# Patient Record
Sex: FEMALE | Race: WHITE | NOT HISPANIC OR LATINO | URBAN - METROPOLITAN AREA
[De-identification: names, ages, dates, MRNs, and addresses within clinical notes are randomized per-mention and may not be internally consistent; named-entity substitution may affect disease eponyms.]

---

## 2018-01-05 ENCOUNTER — APPOINTMENT (RX ONLY)
Dept: URBAN - METROPOLITAN AREA CLINIC 130 | Facility: CLINIC | Age: 38
Setting detail: DERMATOLOGY
End: 2018-01-05

## 2018-01-05 DIAGNOSIS — D22 MELANOCYTIC NEVI: ICD-10-CM

## 2018-01-05 DIAGNOSIS — D18.0 HEMANGIOMA: ICD-10-CM

## 2018-01-05 DIAGNOSIS — L84 CORNS AND CALLOSITIES: ICD-10-CM

## 2018-01-05 PROBLEM — D18.01 HEMANGIOMA OF SKIN AND SUBCUTANEOUS TISSUE: Status: ACTIVE | Noted: 2018-01-05

## 2018-01-05 PROBLEM — D22.61 MELANOCYTIC NEVI OF RIGHT UPPER LIMB, INCLUDING SHOULDER: Status: ACTIVE | Noted: 2018-01-05

## 2018-01-05 PROBLEM — D22.62 MELANOCYTIC NEVI OF LEFT UPPER LIMB, INCLUDING SHOULDER: Status: ACTIVE | Noted: 2018-01-05

## 2018-01-05 PROBLEM — D22.5 MELANOCYTIC NEVI OF TRUNK: Status: ACTIVE | Noted: 2018-01-05

## 2018-01-05 PROBLEM — D48.5 NEOPLASM OF UNCERTAIN BEHAVIOR OF SKIN: Status: ACTIVE | Noted: 2018-01-05

## 2018-01-05 PROBLEM — J45.909 UNSPECIFIED ASTHMA, UNCOMPLICATED: Status: ACTIVE | Noted: 2018-01-05

## 2018-01-05 PROCEDURE — ? COUNSELING

## 2018-01-05 PROCEDURE — 99203 OFFICE O/P NEW LOW 30 MIN: CPT

## 2018-01-05 ASSESSMENT — LOCATION SIMPLE DESCRIPTION DERM
LOCATION SIMPLE: PLANTAR SURFACE OF RIGHT 3RD TOE
LOCATION SIMPLE: RIGHT POSTERIOR UPPER ARM
LOCATION SIMPLE: RIGHT UPPER BACK
LOCATION SIMPLE: RIGHT FOREHEAD
LOCATION SIMPLE: LEFT POSTERIOR UPPER ARM

## 2018-01-05 ASSESSMENT — LOCATION ZONE DERM
LOCATION ZONE: FACE
LOCATION ZONE: TRUNK
LOCATION ZONE: ARM
LOCATION ZONE: TOE

## 2018-01-05 ASSESSMENT — LOCATION DETAILED DESCRIPTION DERM
LOCATION DETAILED: LEFT DISTAL POSTERIOR UPPER ARM
LOCATION DETAILED: RIGHT DISTAL POSTERIOR UPPER ARM
LOCATION DETAILED: RIGHT LATERAL FOREHEAD
LOCATION DETAILED: RIGHT LATERAL PLANTAR 3RD TOE
LOCATION DETAILED: RIGHT INFERIOR UPPER BACK

## 2018-01-05 NOTE — HPI: EVALUATION OF SKIN LESION(S)
How Severe Are Your Spot(S)?: mild
Have Your Spot(S) Been Treated In The Past?: has not been treated
Hpi Title: Evaluation of Skin Lesions
Additional History: also check right foot mid to  noticed a spot there.
Year Removed: 1900

## 2023-04-05 ENCOUNTER — TELEPHONE (OUTPATIENT)
Dept: SURGERY | Facility: CLINIC | Age: 43
End: 2023-04-05

## 2023-04-05 ENCOUNTER — OFFICE VISIT (OUTPATIENT)
Dept: SURGERY | Facility: CLINIC | Age: 43
End: 2023-04-05
Payer: OTHER GOVERNMENT

## 2023-04-05 VITALS — HEIGHT: 67.72 IN | WEIGHT: 158.38 LBS | BODY MASS INDEX: 24.28 KG/M2

## 2023-04-05 DIAGNOSIS — E65 ABDOMINAL PANNUS: Primary | ICD-10-CM

## 2023-04-05 PROBLEM — Z98.891 HX OF CESAREAN SECTION: Status: ACTIVE | Noted: 2023-04-05

## 2023-04-05 PROBLEM — Z90.710 HISTORY OF HYSTERECTOMY: Status: ACTIVE | Noted: 2018-01-01

## 2023-04-05 PROCEDURE — 3008F BODY MASS INDEX DOCD: CPT | Performed by: SURGERY

## 2023-04-05 PROCEDURE — 99243 OFF/OP CNSLTJ NEW/EST LOW 30: CPT | Performed by: SURGERY

## 2023-04-05 RX ORDER — SPIRONOLACTONE 100 MG/1
TABLET, FILM COATED ORAL
COMMUNITY

## 2023-04-05 RX ORDER — TOPIRAMATE 25 MG/1
TABLET ORAL
COMMUNITY

## 2023-04-05 RX ORDER — NALTREXONE HYDROCHLORIDE AND BUPROPION HYDROCHLORIDE 8; 90 MG/1; MG/1
TABLET, EXTENDED RELEASE ORAL
COMMUNITY

## 2023-04-05 RX ORDER — METFORMIN HYDROCHLORIDE 500 MG/1
1 TABLET, EXTENDED RELEASE ORAL 3 TIMES DAILY
COMMUNITY

## 2023-04-05 RX ORDER — FUROSEMIDE 20 MG/1
20 TABLET ORAL 2 TIMES DAILY
COMMUNITY
Start: 2023-01-15

## 2023-04-05 RX ORDER — NYSTATIN 100000 U/G
30 CREAM TOPICAL AS NEEDED
COMMUNITY

## 2023-04-05 RX ORDER — ALPRAZOLAM 0.25 MG/1
TABLET ORAL
COMMUNITY

## 2023-04-05 RX ORDER — ALBUTEROL SULFATE 90 UG/1
AEROSOL, METERED RESPIRATORY (INHALATION)
COMMUNITY

## 2023-04-05 NOTE — TELEPHONE ENCOUNTER
Patient instructed to have her nutritional labs drawn. Areli verbalized understanding and was appreciative of the call. [id number] Detailed

## 2023-05-03 ENCOUNTER — TELEPHONE (OUTPATIENT)
Dept: SURGERY | Facility: CLINIC | Age: 43
End: 2023-05-03

## 2023-05-03 NOTE — TELEPHONE ENCOUNTER
patient called to state that her \"referral to insurance in incomplete from Dr. Efrain Tristan" She stated that her insurance is trying to authorize a panniculectomy. Call was transferred to Dung Hein, Cosmetic Coordinator.

## 2023-05-04 ENCOUNTER — TELEPHONE (OUTPATIENT)
Dept: SURGERY | Facility: CLINIC | Age: 43
End: 2023-05-04

## 2023-05-04 NOTE — TELEPHONE ENCOUNTER
Returning pt's call, lvm stating her case has been updated with the missing information and we will reach back out once we receive a determination. Left my direct call back number should she have any other questions or concerns.

## 2023-05-05 ENCOUNTER — TELEPHONE (OUTPATIENT)
Dept: SURGERY | Facility: CLINIC | Age: 43
End: 2023-05-05

## 2023-05-05 NOTE — TELEPHONE ENCOUNTER
Spoke with patient to remind her to get nutritional labs completed. Patient stated she will have these done and was appreciative of the call.

## 2023-05-23 ENCOUNTER — TELEPHONE (OUTPATIENT)
Dept: SURGERY | Facility: CLINIC | Age: 43
End: 2023-05-23

## 2024-01-10 ENCOUNTER — OFFICE VISIT (OUTPATIENT)
Dept: SURGERY | Facility: CLINIC | Age: 44
End: 2024-01-10
Payer: OTHER GOVERNMENT

## 2024-01-10 DIAGNOSIS — E65 ABDOMINAL PANNUS: Primary | ICD-10-CM

## 2024-01-10 PROCEDURE — 99213 OFFICE O/P EST LOW 20 MIN: CPT | Performed by: SURGERY

## 2024-01-10 NOTE — PATIENT INSTRUCTIONS
Surgeon:              Dr. Breann Smith, PhD                                          Tel:         [phone]                                  Fax:        [phone]      Surgery/Procedure: Panniculectomy, abdominoplasty . 3.5 hours, general anesthesia, outpatient.     Dx Code: E65    Hospital:  [facility]: [address] [place] [address]           [phone]  [facility]: [address], [place] [address]               [phone]    1. Someone will need to drive you to and from the hospital if your procedure is outpatient.    2.Do not drink alcohol or smoke 24 hours prior to your procedure.    3. Bring a picture ID and your insurance card.    4. You will be contacted by the hospital the day before to confirm the procedure time and location.     5. Do not take any herbal supplements or blood thinners at least one week before your procedure/surgery. This includes NSAID's (aspirin, baby aspirin, Motrin, Ibuprofen, Aleve, Advil, Naproxen, etc), Plavix, fish oil, vitamin E, turmeric, CoQ10, or green tea supplements, etc. *TYLENOL or acetaminophen is ok to take*    6. PRE-OPERATIVE TESTING: History and physical with medical clearance is REQUIRED within 30 days of the surgery date and is mandatory per Dr. Smith. *If this is not done, your surgery will be postponed*  MEDICAL CLEARANCE WITH Dr. Monroe  CBC  CMP  EKG (within 90 days)  Nutritional Labs  Discuss when to discontinue Contrave with prescribing provider perioperatively     7. Please inform us if you start or change any medications at least one week before surgery (ex: blood thinners, weight loss medications, diabetic medications, herbal supplements, etc)    8. Does patient have diagnosis of sleep apnea?    [   ] Yes     [ X ]  No    Consent obtained

## 2024-01-10 NOTE — CONSULTS
Estabilshed Patient Consultation    Chief Complaint: abdominal pannus     History of Present Illness:   Areli Villanueva is a 43 year old female who returns to the office for her abdominal pannus.  She has lost approximately 60 to 65 pounds naturally.  She reports she is at her goal weight and has been here for approximately 1 year.  She exercises 2-3 times per week.  She has skin rashes and irritations often on worse in the spring to fall months (warmer months).  She has tried nystatin cream for her rashes.  She does note lower back pain and has seen her primary care provider, a chiropractor, a massage therapist for this.  She bought a massage chair for her back pain.  She does take pain medication for her pain when needed.  She is here today to review surgical treatment options for her abdominal pannus.       Past Medical History:      Past Medical History:   Diagnosis Date    Anxiety     PCOS (polycystic ovarian syndrome)          Past Surgical History:  Past Surgical History:   Procedure Laterality Date    HC  SECTION LEVEL I      , , , 2017    HYSTERECTOMY  2018         Medications:    No outpatient medications have been marked as taking for the 1/10/24 encounter (Office Visit) with Breann Smith MD.         Allergies:    No Known Allergies      Family History:   Family History   Problem Relation Age of Onset    Melanoma Mother     Diabetes Father     PTSD Brother     Stroke Brother          Social History:  History   Alcohol Use    Yes       History   Smoking Status    Never   Smokeless Tobacco    Never       History   Drug Use Unknown         Review of Systems:    The patient reports: see HPI  All other systems are unremarkable.      Physical Exam:    There were no vitals taken for this visit.    Constitutional: The patient is an alert, oriented and well-developed.     Neurologic: Speech patterns and movements are normal.     Psychiatric: Affect is appropriate.    Eyes: Conjunctiva are  clear, non-icteric. PERRL    ENT: no obvious abnormality, no ear drainage, mucous membranes moist and pink    Integument/Skin: The skin appears normal. There are no suspicious appearing rashes or lesions.    Respiratory: Normal respiratory effort.     Cardiovascular: no cyanosis, no edema    Gastrointestinal: no hernias, skin irritation in her  scar, pannus past  scar onto mons pubis      Musculoskeletal: Extremities unremarkable, without edema, tenderness or deformities    Impression:   Areli Villanueva  is a 43 year old with a symptomatic abdominal pannus     Discussion and Plan:  The patient was counseled on the different treatment options.     Patient has a symptomatic abdominal pannus. She has lost approximately 60-65 pounds and has been stable at her goal weight for approximately 1 year.  She has skin irritations and rashes and has sought treatment with nystatin cream.  She notes lower back pain due to her pannus and his sought treatment from her primary care provider, a chiropractor, and a massage therapist.  She takes pain medication when needed due to her abdominal pannus.  We reviewed the option for panniculectomy, abdominoplasty.  This is indicated and medically necessary. She would like to proceed with panniculectomy, abdominoplasty. We discussed the expected post-operative course in detail including the need for drains. She reports she got her nutritional labs at an outside lab and will send these to us. She will obtain routine preoperative testing and clearance.     The different treatment options were discussed with the patient.  The procedures and the postoperative care was discussed in detail.  Potential risks complications benefits and alternatives were discussed.  Risks and complications including but not limited to infection, bleeding, scarring, damage to surrounding structures, such as nerves, blood vessels, muscles,  tendons, risk of hematoma, seroma, foreign body reaction,  allergic reaction, wound dehiscences, delayed wound healing, asymmetry, umbilical necrosis, skin necrosis, numbness, need for further surgery.    Patient understands and wishes to proceed with the procedure, questions were answered.    25 minutes were spent with the patient, from which 20 minutes were spent counseling the patient and coordinating care.

## 2024-01-11 ENCOUNTER — TELEPHONE (OUTPATIENT)
Dept: SURGERY | Facility: CLINIC | Age: 44
End: 2024-01-11

## 2024-01-11 NOTE — TELEPHONE ENCOUNTER
Patient is taking Contrave per her medical record. I called to inform the patient that she will need to discuss when to stop this perioperatively with her prescribing provider once she is given a surgery date.  Patient informed me that she gets this medication through her Endocrinologist.   Patient was appreciative of the call and verbalized understanding.

## 2024-01-15 ENCOUNTER — HOSPITAL ENCOUNTER (OUTPATIENT)
Age: 44
Discharge: HOME | End: 2024-01-15
Payer: OTHER GOVERNMENT

## 2024-01-15 VITALS
TEMPERATURE: 98 F | RESPIRATION RATE: 18 BRPM | HEIGHT: 67 IN | DIASTOLIC BLOOD PRESSURE: 79 MMHG | OXYGEN SATURATION: 96 % | SYSTOLIC BLOOD PRESSURE: 113 MMHG | BODY MASS INDEX: 24.33 KG/M2 | WEIGHT: 155 LBS | HEART RATE: 89 BPM

## 2024-01-15 DIAGNOSIS — J02.9 SORE THROAT: Primary | ICD-10-CM

## 2024-01-15 LAB
S PYO AG THROAT QL: NEGATIVE
SARS-COV-2 RNA RESP QL NAA+PROBE: NOT DETECTED

## 2024-01-15 PROCEDURE — 87880 STREP A ASSAY W/OPTIC: CPT | Performed by: NURSE PRACTITIONER

## 2024-01-15 PROCEDURE — 99203 OFFICE O/P NEW LOW 30 MIN: CPT | Performed by: NURSE PRACTITIONER

## 2024-01-15 PROCEDURE — U0002 COVID-19 LAB TEST NON-CDC: HCPCS | Performed by: NURSE PRACTITIONER

## 2024-01-15 RX ORDER — IBUPROFEN 600 MG/1
600 TABLET ORAL ONCE
Status: COMPLETED | OUTPATIENT
Start: 2024-01-15 | End: 2024-01-15

## 2024-01-15 RX ORDER — CHLORAL HYDRATE 500 MG
1000 CAPSULE ORAL DAILY
COMMUNITY

## 2024-01-15 NOTE — ED PROVIDER NOTES
Patient Seen in: [facility]      History     Chief Complaint   Patient presents with    Sore Throat     Stated Complaint: lost voice    Subjective:   HPI  43-year-old female presents to the IC with complaints of a sore throat and loss of voice for last 2 to 3 days.  States her  is sick home with a ear infection.  Denies any different swelling just pain with swallowing.  No fever.  No shortness of breath or any difficulty breathing.  Patient has no other issues complaints or concerns.  The patient's medication list, past medical history and social history elements as listed in today's nurse's notes were reviewed and agreed (except as otherwise stated in the HPI).  The patient's family history reviewed and determined to be noncontributory to the presenting problem.      Objective:   Past Medical History:   Diagnosis Date    Anxiety     PCOS (polycystic ovarian syndrome)               Past Surgical History:   Procedure Laterality Date    HC  SECTION LEVEL I      , , , 2017    HYSTERECTOMY  2018                No pertinent social history.            Review of Systems    Positive for stated complaint: lost voice  Other systems are as noted in HPI.  Constitutional and vital signs reviewed.      All other systems reviewed and negative except as noted above.    Physical Exam     ED Triage Vitals [01/15/24 0807]   /79   Pulse 89   Resp 18   Temp 98.1 °F (36.7 °C)   Temp src Temporal   SpO2 96 %   O2 Device None (Room air)       Current:/79   Pulse 89   Temp 98.1 °F (36.7 °C) (Temporal)   Resp 18   Ht 170.2 cm (5' 7\")   Wt 70.3 kg   SpO2 96%   BMI 24.28 kg/m²         Physical Exam    GENERAL: The patient is well-developed well-nourished nontoxic, non-ill-appearing  HEENT: Normocephalic.  Atraumatic.  Extraocular motions are intact  NECK: Supple.  No meningitic signs are noted.   CHEST/LUNGS: Clear to auscultation.  There is no respiratory distress  noted.  HEART/CARDIOVASCULAR: Regular.  There is no tachycardia.   SKIN: There is no rash.  NEURO: The patient is awake, alert, and oriented.  The patient is cooperative.    ED Course     Labs Reviewed   POCT RAPID STREP - Normal   RAPID SARS-COV-2 BY PCR - Normal                   MDM   Pertinent Labs & Imaging studies reviewed. (See chart for details)  Differential diagnosis considered but not limited to: Strep, viral pharyngitis, COVID, peritonsillar abscess, viral syndrome  Patient coming in with sore throat for 2 to 3 days. Patient provided with pain medication. Labs reviewed negative strep negative COVID.  . Will treat for viral pharyngitis. Will discharge on over-the-counter supportive care see discharge note for instructions. Patient is comfortable with this plan.  Overall Pt looks good. Non-toxic, well-hydrated and in no respiratory distress. Vital signs are reassuring. Exam is reassuring. I do not believe pt  requires and additional  diagnostic studiesor intervention. I believe pt  can be discharged home to continue evaluation as an outpatient. Follow-up provider given. Discharge instructions given and reviewed. Return for any problems. All understand and agreewith the plan.    Please note that this report has been produced using speech recognition software and may contain errors related to that system including, but not limited to, errors in grammar, punctuation, and spelling, as well as words and phrases that possibly may have been recognized inappropriately.  If there are any questions or concerns, contact the dictating provider for clarification.    Note to patient: The 21st Century Cures Act makes medical notes like these available to patients in the interest of transparency. However, this is a medical document intended as peer to peer communication. It is written in medical language and may contain abbreviations or verbiage that are unfamiliar. It may appear blunt or direct. Medical documents are  intended to carry relevant information, facts as evident, and the clinical opinion of the practitioner.                                      Medical Decision Making      Disposition and Plan     Clinical Impression:  1. Sore throat         Disposition:  Discharge  1/15/2024  8:29 am    Follow-up:  No follow-up provider specified.        Medications Prescribed:  Current Discharge Medication List

## 2024-01-15 NOTE — DISCHARGE INSTRUCTIONS
Follow-up with your primary care physician in one week if symptoms have not improved or symptoms are starting to get worse.  Increase fluids, keep well-hydrated.  Take Tylenol and Motrin for fever and pain.  Eat and drink anything that is soothing to the back of the throat.  Gargle with warm salt water, throat lozenges will be helpful.

## 2024-01-25 ENCOUNTER — TELEPHONE (OUTPATIENT)
Dept: SURGERY | Facility: CLINIC | Age: 44
End: 2024-01-25

## 2024-01-25 DIAGNOSIS — E65 ABDOMINAL PANNUS: Primary | ICD-10-CM

## 2024-01-25 NOTE — TELEPHONE ENCOUNTER
Calling pt in regards to scheduling surgery.  Informed pt that I have 4/18/24 available at [facility] with Dr. Smith.  Pt verbalized understanding and in agreement with date and location.  All questions answered.   Encouraged pt to call or [employer] message office with any other questions or concerns.

## 2024-02-27 ENCOUNTER — TELEPHONE (OUTPATIENT)
Dept: SURGERY | Facility: CLINIC | Age: 44
End: 2024-02-27

## 2024-02-27 NOTE — TELEPHONE ENCOUNTER
Called pt to review her upcoming case with Dr. Smith. Let pt know that I did receive a new approval for her panniculectomy through her insurance as the previous authorization did . Reviewed the deposit amount required for her procedure, pt stated she would like to pay that today and have me run the same card on 24. I let Crystal know that I would send her all the receipts for the payments along with the cosmetassure information once all payments have been made. Pt verbalized understanding.  Pt has my direct phone number should she have any other questions, she was appreciative of my help.

## 2024-04-08 ENCOUNTER — TELEPHONE (OUTPATIENT)
Dept: SURGERY | Facility: CLINIC | Age: 44
End: 2024-04-08

## 2024-04-08 NOTE — TELEPHONE ENCOUNTER
[facility] her nutritional lab results to us for her upcoming surgery.

## 2024-04-09 DIAGNOSIS — E65 ABDOMINAL PANNUS: Primary | ICD-10-CM

## 2024-04-10 ENCOUNTER — LAB ENCOUNTER (OUTPATIENT)
Dept: LAB | Age: 44
End: 2024-04-10
Attending: PHYSICIAN ASSISTANT
Payer: OTHER GOVERNMENT

## 2024-04-10 DIAGNOSIS — E65 ABDOMINAL PANNUS: ICD-10-CM

## 2024-04-10 LAB
ALBUMIN SERPL-MCNC: 3.7 G/DL (ref 3.4–5)
DEPRECATED HBV CORE AB SER IA-ACNC: 58.8 NG/ML
ERYTHROCYTE [DISTWIDTH] IN BLOOD BY AUTOMATED COUNT: 14.3 %
HCT VFR BLD AUTO: 39.9 %
HGB BLD-MCNC: 12.8 G/DL
IRON SATN MFR SERPL: 17 %
IRON SERPL-MCNC: 71 UG/DL
MCH RBC QN AUTO: 29.1 PG (ref 26–34)
MCHC RBC AUTO-ENTMCNC: 32.1 G/DL (ref 31–37)
MCV RBC AUTO: 90.7 FL
PLATELET # BLD AUTO: 236 10(3)UL (ref 150–450)
RBC # BLD AUTO: 4.4 X10(6)UL
TIBC SERPL-MCNC: 414 UG/DL (ref 240–450)
TRANSFERRIN SERPL-MCNC: 278 MG/DL (ref 200–360)
VIT D+METAB SERPL-MCNC: 75.4 NG/ML (ref 30–100)
WBC # BLD AUTO: 6.6 X10(3) UL (ref 4–11)

## 2024-04-10 PROCEDURE — 82728 ASSAY OF FERRITIN: CPT

## 2024-04-10 PROCEDURE — 84134 ASSAY OF PREALBUMIN: CPT

## 2024-04-10 PROCEDURE — 82040 ASSAY OF SERUM ALBUMIN: CPT

## 2024-04-10 PROCEDURE — 83540 ASSAY OF IRON: CPT

## 2024-04-10 PROCEDURE — 36415 COLL VENOUS BLD VENIPUNCTURE: CPT

## 2024-04-10 PROCEDURE — 83550 IRON BINDING TEST: CPT

## 2024-04-10 PROCEDURE — 82306 VITAMIN D 25 HYDROXY: CPT

## 2024-04-10 PROCEDURE — 85027 COMPLETE CBC AUTOMATED: CPT

## 2024-04-11 LAB — PREALB SERPL-MCNC: 30.3 MG/DL (ref 20–40)

## 2024-04-18 ENCOUNTER — HOSPITAL ENCOUNTER (OUTPATIENT)
Facility: HOSPITAL | Age: 44
Setting detail: HOSPITAL OUTPATIENT SURGERY
Discharge: HOME | End: 2024-04-18
Attending: SURGERY | Admitting: SURGERY
Payer: OTHER GOVERNMENT

## 2024-04-18 ENCOUNTER — ANESTHESIA EVENT (OUTPATIENT)
Dept: SURGERY | Facility: HOSPITAL | Age: 44
End: 2024-04-18
Payer: OTHER GOVERNMENT

## 2024-04-18 ENCOUNTER — ANESTHESIA (OUTPATIENT)
Dept: SURGERY | Facility: HOSPITAL | Age: 44
End: 2024-04-18
Payer: OTHER GOVERNMENT

## 2024-04-18 VITALS
TEMPERATURE: 99 F | HEIGHT: 67 IN | HEART RATE: 109 BPM | DIASTOLIC BLOOD PRESSURE: 74 MMHG | RESPIRATION RATE: 16 BRPM | SYSTOLIC BLOOD PRESSURE: 108 MMHG | OXYGEN SATURATION: 97 % | BODY MASS INDEX: 25.96 KG/M2 | WEIGHT: 165.38 LBS

## 2024-04-18 DIAGNOSIS — E65 ABDOMINAL PANNUS: Primary | ICD-10-CM

## 2024-04-18 PROCEDURE — 76942 ECHO GUIDE FOR BIOPSY: CPT | Performed by: ANESTHESIOLOGY

## 2024-04-18 PROCEDURE — 0JB80ZZ EXCISION OF ABDOMEN SUBCUTANEOUS TISSUE AND FASCIA, OPEN APPROACH: ICD-10-PCS | Performed by: SURGERY

## 2024-04-18 PROCEDURE — 0W0F0ZZ ALTERATION OF ABDOMINAL WALL, OPEN APPROACH: ICD-10-PCS | Performed by: SURGERY

## 2024-04-18 RX ORDER — DEXAMETHASONE SODIUM PHOSPHATE 4 MG/ML
VIAL (ML) INJECTION AS NEEDED
Status: DISCONTINUED | OUTPATIENT
Start: 2024-04-18 | End: 2024-04-18 | Stop reason: SURG

## 2024-04-18 RX ORDER — HYDROCODONE BITARTRATE AND ACETAMINOPHEN 5; 325 MG/1; MG/1
1 TABLET ORAL ONCE AS NEEDED
Status: DISCONTINUED | OUTPATIENT
Start: 2024-04-18 | End: 2024-04-18

## 2024-04-18 RX ORDER — HYDROCODONE BITARTRATE AND ACETAMINOPHEN 5; 325 MG/1; MG/1
2 TABLET ORAL ONCE AS NEEDED
Status: DISCONTINUED | OUTPATIENT
Start: 2024-04-18 | End: 2024-04-18

## 2024-04-18 RX ORDER — IBUPROFEN 600 MG/1
600 TABLET ORAL ONCE AS NEEDED
Status: DISCONTINUED | OUTPATIENT
Start: 2024-04-18 | End: 2024-04-18

## 2024-04-18 RX ORDER — SODIUM CHLORIDE, SODIUM LACTATE, POTASSIUM CHLORIDE, CALCIUM CHLORIDE 600; 310; 30; 20 MG/100ML; MG/100ML; MG/100ML; MG/100ML
INJECTION, SOLUTION INTRAVENOUS CONTINUOUS
Status: DISCONTINUED | OUTPATIENT
Start: 2024-04-18 | End: 2024-04-18

## 2024-04-18 RX ORDER — PROCHLORPERAZINE EDISYLATE 5 MG/ML
5 INJECTION INTRAMUSCULAR; INTRAVENOUS EVERY 8 HOURS PRN
Status: DISCONTINUED | OUTPATIENT
Start: 2024-04-18 | End: 2024-04-18

## 2024-04-18 RX ORDER — HYDROMORPHONE HYDROCHLORIDE 1 MG/ML
0.2 INJECTION, SOLUTION INTRAMUSCULAR; INTRAVENOUS; SUBCUTANEOUS EVERY 5 MIN PRN
Status: DISCONTINUED | OUTPATIENT
Start: 2024-04-18 | End: 2024-04-18

## 2024-04-18 RX ORDER — DOCUSATE SODIUM 100 MG/1
100 CAPSULE, LIQUID FILLED ORAL 2 TIMES DAILY
Qty: 30 CAPSULE | Refills: 0 | Status: SHIPPED | OUTPATIENT
Start: 2024-04-18

## 2024-04-18 RX ORDER — SCOLOPAMINE TRANSDERMAL SYSTEM 1 MG/1
1 PATCH, EXTENDED RELEASE TRANSDERMAL ONCE
Status: DISCONTINUED | OUTPATIENT
Start: 2024-04-18 | End: 2024-04-18 | Stop reason: HOSPADM

## 2024-04-18 RX ORDER — LIDOCAINE HYDROCHLORIDE 10 MG/ML
INJECTION, SOLUTION EPIDURAL; INFILTRATION; INTRACAUDAL; PERINEURAL AS NEEDED
Status: DISCONTINUED | OUTPATIENT
Start: 2024-04-18 | End: 2024-04-18 | Stop reason: SURG

## 2024-04-18 RX ORDER — MIDAZOLAM HYDROCHLORIDE 1 MG/ML
INJECTION INTRAMUSCULAR; INTRAVENOUS AS NEEDED
Status: DISCONTINUED | OUTPATIENT
Start: 2024-04-18 | End: 2024-04-18 | Stop reason: SURG

## 2024-04-18 RX ORDER — ACETAMINOPHEN 500 MG
1000 TABLET ORAL ONCE
Status: DISCONTINUED | OUTPATIENT
Start: 2024-04-18 | End: 2024-04-18 | Stop reason: HOSPADM

## 2024-04-18 RX ORDER — ACETAMINOPHEN 500 MG
1000 TABLET ORAL ONCE AS NEEDED
Status: DISCONTINUED | OUTPATIENT
Start: 2024-04-18 | End: 2024-04-18

## 2024-04-18 RX ORDER — HYDROMORPHONE HYDROCHLORIDE 1 MG/ML
0.4 INJECTION, SOLUTION INTRAMUSCULAR; INTRAVENOUS; SUBCUTANEOUS EVERY 5 MIN PRN
Status: DISCONTINUED | OUTPATIENT
Start: 2024-04-18 | End: 2024-04-18

## 2024-04-18 RX ORDER — NALOXONE HYDROCHLORIDE 0.4 MG/ML
0.08 INJECTION, SOLUTION INTRAMUSCULAR; INTRAVENOUS; SUBCUTANEOUS AS NEEDED
Status: DISCONTINUED | OUTPATIENT
Start: 2024-04-18 | End: 2024-04-18

## 2024-04-18 RX ORDER — CEPHALEXIN 500 MG/1
500 CAPSULE ORAL 4 TIMES DAILY
Qty: 20 CAPSULE | Refills: 0 | Status: SHIPPED | OUTPATIENT
Start: 2024-04-18

## 2024-04-18 RX ORDER — HYDROCODONE BITARTRATE AND ACETAMINOPHEN 5; 325 MG/1; MG/1
1-2 TABLET ORAL EVERY 4 HOURS PRN
Qty: 40 TABLET | Refills: 0 | Status: SHIPPED | OUTPATIENT
Start: 2024-04-18

## 2024-04-18 RX ORDER — ONDANSETRON 4 MG/1
4 TABLET, FILM COATED ORAL EVERY 8 HOURS PRN
Qty: 10 TABLET | Refills: 0 | Status: SHIPPED | OUTPATIENT
Start: 2024-04-18

## 2024-04-18 RX ORDER — ONDANSETRON 2 MG/ML
INJECTION INTRAMUSCULAR; INTRAVENOUS AS NEEDED
Status: DISCONTINUED | OUTPATIENT
Start: 2024-04-18 | End: 2024-04-18 | Stop reason: SURG

## 2024-04-18 RX ORDER — HYDROMORPHONE HYDROCHLORIDE 1 MG/ML
0.6 INJECTION, SOLUTION INTRAMUSCULAR; INTRAVENOUS; SUBCUTANEOUS EVERY 5 MIN PRN
Status: DISCONTINUED | OUTPATIENT
Start: 2024-04-18 | End: 2024-04-18

## 2024-04-18 RX ORDER — ONDANSETRON 2 MG/ML
4 INJECTION INTRAMUSCULAR; INTRAVENOUS EVERY 6 HOURS PRN
Status: DISCONTINUED | OUTPATIENT
Start: 2024-04-18 | End: 2024-04-18

## 2024-04-18 RX ORDER — CEFAZOLIN SODIUM/WATER 2 G/20 ML
2 SYRINGE (ML) INTRAVENOUS ONCE
Status: COMPLETED | OUTPATIENT
Start: 2024-04-18 | End: 2024-04-18

## 2024-04-18 RX ORDER — ROCURONIUM BROMIDE 10 MG/ML
INJECTION, SOLUTION INTRAVENOUS AS NEEDED
Status: DISCONTINUED | OUTPATIENT
Start: 2024-04-18 | End: 2024-04-18 | Stop reason: SURG

## 2024-04-18 RX ADMIN — ONDANSETRON 4 MG: 2 INJECTION INTRAMUSCULAR; INTRAVENOUS at 11:15:00

## 2024-04-18 RX ADMIN — SODIUM CHLORIDE, SODIUM LACTATE, POTASSIUM CHLORIDE, CALCIUM CHLORIDE: 600; 310; 30; 20 INJECTION, SOLUTION INTRAVENOUS at 11:07:00

## 2024-04-18 RX ADMIN — ROCURONIUM BROMIDE 20 MG: 10 INJECTION, SOLUTION INTRAVENOUS at 12:15:00

## 2024-04-18 RX ADMIN — MIDAZOLAM HYDROCHLORIDE 2 MG: 1 INJECTION INTRAMUSCULAR; INTRAVENOUS at 11:03:00

## 2024-04-18 RX ADMIN — ROCURONIUM BROMIDE 10 MG: 10 INJECTION, SOLUTION INTRAVENOUS at 13:38:00

## 2024-04-18 RX ADMIN — DEXAMETHASONE SODIUM PHOSPHATE 8 MG: 4 MG/ML VIAL (ML) INJECTION at 11:15:00

## 2024-04-18 RX ADMIN — SODIUM CHLORIDE, SODIUM LACTATE, POTASSIUM CHLORIDE, CALCIUM CHLORIDE: 600; 310; 30; 20 INJECTION, SOLUTION INTRAVENOUS at 14:48:00

## 2024-04-18 RX ADMIN — LIDOCAINE HYDROCHLORIDE 25 MG: 10 INJECTION, SOLUTION EPIDURAL; INFILTRATION; INTRACAUDAL; PERINEURAL at 11:06:00

## 2024-04-18 RX ADMIN — ROCURONIUM BROMIDE 10 MG: 10 INJECTION, SOLUTION INTRAVENOUS at 11:07:00

## 2024-04-18 RX ADMIN — CEFAZOLIN SODIUM/WATER 2 G: 2 G/20 ML SYRINGE (ML) INTRAVENOUS at 11:03:00

## 2024-04-18 RX ADMIN — ROCURONIUM BROMIDE 30 MG: 10 INJECTION, SOLUTION INTRAVENOUS at 11:16:00

## 2024-04-18 NOTE — BRIEF OP NOTE
Pre-Operative Diagnosis: Abdominal pannus [E65]     Post-Operative Diagnosis: Abdominal pannus [E65]      Procedure Performed:   Panniculectomy, abdominoplasty    Surgeons and Role:     * Breann Smith MD - Primary    Assistant(s):  Surgical Assistant.: Daisy Baker     Surgical Findings:  see dictation     Specimen: none     Estimated Blood Loss: Blood Output: 20 mL (4/18/2024  2:36 PM)      Dictation Number:      Breann Smith MD  4/18/2024  2:49 PM

## 2024-04-18 NOTE — ANESTHESIA PROCEDURE NOTES
Regional Block    Performed by: Nick Pagan MD  Authorized by: Nick Pagan MD      General Information and Staff    Start Time:   Anesthesiologist:  Nick Pagan MD  Performed by:  Anesthesiologist  Patient Location:  OR      Site Identification: real time ultrasound guided and image stored and retrievable    Block site/laterality marked before start: site marked  Reason for Block: at surgeon's request and post-op pain management    Preanesthetic Checklist: 2 patient identifers, IV checked, risks and benefits discussed, monitors and equipment checked, pre-op evaluation, timeout performed, anesthesia consent, sterile technique used, no prohibitive neurological deficits and no local skin infection at insertion site      Procedure Details    Patient Position:   Prep: ChloraPrep    Monitoring:  Cardiac monitor, continuous pulse ox and blood pressure cuff  Block Type:  TAP  Laterality:  Bilateral  Injection Technique:  Single-shot    Needle    Needle Type:  Short-bevel and echogenic  Needle Length:  100 mm  Needle Localization:  Ultrasound guidance  Reason for Ultrasound Use: appropriate spread of the medication was noted in real time and no ultrasound evidence of intravascular and/or intraneural injection            Assessment    Injection Assessment:  Good spread noted, negative resistance, negative aspiration for heme, incremental injection and low pressure  Heart Rate Change: No    - Patient tolerated block procedure well without evidence of immediate block related complications.     Medications      Additional Comments    Medication:  Bupivacaine 0.25% 30mL B/L PER DR NEEL ROMERO         911 or go to the nearest Emergency Room [place]...

## 2024-04-18 NOTE — ANESTHESIA POSTPROCEDURE EVALUATION
[facility]    Areli Villanueva Patient Status:  Hospital Outpatient Surgery   Age/Gender 43 year old female MRN [id number]   Location [facility] SURGERY Attending Breann Smith MD   Hosp Day # 0 PCP Anya Monroe DO       Anesthesia Post-op Note    Panniculectomy, abdominoplasty    Procedure Summary       Date: 04/18/24 Room / Location:  MAIN OR 03 / [facility] MAIN OR    Anesthesia Start: 1101 Anesthesia Stop:     Procedure: Panniculectomy, abdominoplasty (Abdomen) Diagnosis:       Abdominal pannus      (Abdominal pannus [E65])    Surgeons: Breann Smith MD Anesthesiologist: Nick Pagan MD    Anesthesia Type: general ASA Status: 2            Anesthesia Type: general    Vitals Value Taken Time   /93 04/18/24 1449   Temp 98 04/18/24 1449   Pulse 104 04/18/24 1449   Resp 18 04/18/24 1449   SpO2 100 % 04/18/24 1448   Vitals shown include unfiled device data.    Patient Location: PACU    Anesthesia Type: general    Airway Patency: patent    Postop Pain Control: adequate    Mental Status: mildly sedated but able to meaningfully participate in the post-anesthesia evaluation    Nausea/Vomiting: none    Cardiopulmonary/Hydration status: stable euvolemic    Complications: no apparent anesthesia related complications    Postop vital signs: stable    Dental Exam: Unchanged from Preop    Patient to be discharged from PACU when criteria met.

## 2024-04-18 NOTE — ANESTHESIA PREPROCEDURE EVALUATION
PRE-OP EVALUATION    Patient Name: Areli Villanueva    Admit Diagnosis: Abdominal pannus [E65]    Pre-op Diagnosis: Abdominal pannus [E65]    Panniculectomy, abdominoplasty    Anesthesia Procedure: Panniculectomy, abdominoplasty    Surgeons and Role:     * Breann Smith MD - Primary    Pre-op vitals reviewed.        Body mass index is 24.28 kg/m².    Current medications reviewed.  Hospital Medications:   acetaminophen (Tylenol Extra Strength) tab 1,000 mg  1,000 mg Oral Once    scopolamine (Transderm-Scop) 1 MG/3DAYS patch 1 patch  1 patch Transdermal Once    lactated ringers infusion   Intravenous Continuous    ceFAZolin (Ancef) 2 g in 20mL IV syringe premix  2 g Intravenous Once       Outpatient Medications:     Medications Prior to Admission   Medication Sig Dispense Refill Last Dose    omega-3 fatty acids 1000 MG Oral Cap Take 1,000 mg by mouth daily.       Melatonin 1 MG Oral Cap Take by mouth.       metFORMIN  MG Oral Tablet 24 Hr Take 1 tablet (500 mg total) by mouth in the morning, at noon, and at bedtime. PCOS       nystatin 100,000 Units/g External Cream 30 mg as needed.       furosemide 20 MG Oral Tab Take 1 tablet (20 mg total) by mouth 2 (two) times daily.       Naltrexone-buPROPion HCl ER (CONTRAVE) 8-90 MG Oral Tablet 12 Hr Contrave 8 mg-90 mg tablet,extended release   Past Month    albuterol 108 (90 Base) MCG/ACT Inhalation Aero Soln albuterol sulfate HFA 90 mcg/actuation aerosol inhaler   INHALE 2 PUFFS BY MOUTH EVERY 4 HOURS       ALPRAZolam 0.25 MG Oral Tab alprazolam 0.25 mg tablet   TAKE 1 TABLET BY MOUTH THREE TIMES DAILY AS NEEDED       spironolactone 100 MG Oral Tab spironolactone 100 mg tablet   TAKE 1 TABLET BY MOUTH EVERY DAY       Cholecalciferol 125 MCG (5000 UT) Oral Tab Take 1 tablet (5,000 Units total) by mouth daily.          Allergies: Patient has no known allergies.      Anesthesia Evaluation    Patient summary reviewed.    Anesthetic Complications            GI/Hepatic/Renal                (+) liver disease                 Cardiovascular    Negative cardiovascular ROS.                                                   Endo/Other    Negative endo/other ROS.                              Pulmonary      (+) asthma                     Neuro/Psych    Negative neuro/psych ROS.                                Past Surgical History:   Procedure Laterality Date          Hc  section level i      , , , 2017    Hysterectomy  2018     Social History     Socioeconomic History    Marital status:    Tobacco Use    Smoking status: Never    Smokeless tobacco: Never   Vaping Use    Vaping status: Never Used   Substance and Sexual Activity    Alcohol use: Yes     Comment: 1X/MONTH    Drug use: Never     History   Drug Use Unknown     Available pre-op labs reviewed.  Lab Results   Component Value Date    WBC 6.6 04/10/2024    RBC 4.40 04/10/2024    HGB 12.8 04/10/2024    HCT 39.9 04/10/2024    MCV 90.7 04/10/2024    MCH 29.1 04/10/2024    MCHC 32.1 04/10/2024    RDW 14.3 04/10/2024    .0 04/10/2024               Airway      Mallampati: II       Cardiovascular    Cardiovascular exam normal.         Dental    Dentition appears grossly intact         Pulmonary    Pulmonary exam normal.                 Other findings              ASA: 2   Plan: general  NPO status verified and           Plan/risks discussed with: patient              Present on Admission:  **None**

## 2024-04-18 NOTE — DISCHARGE INSTRUCTIONS
NADIR care and recording   Keep steristrips in place   Change umbilical dressing Monday, then apply neosporin ointment to umbilicus daily  Sleep on several pillow behind back and underneath knees, beach chair recliner position   Dont get NADIR sites wet   Follow up in 10 days with PA for NADIR check and suture removal umbilicus   See me in 3 weeks      Caring for a Closed Suction Drainage Tube  A drainage tube removes fluid from around an incision. This helps prevent infection and promotes healing. The collection bulb at the end of the tube is squeezed and plugged to create suction. The bulb should be emptied and reset when half full to maintain adequate suction. You need to empty the bulb and clean the skin around the drain as often as your healthcare provider tells you to. Follow the steps you were taught in the hospital, including how to measure and keep track of how much fluid is coming out of the drain, or how to flush the tube if needed. Follow your healthcare team's specific instructions.         Supplies  Have the following items ready:  Disposable gloves  Measuring cup  Record sheet  Gauze or paper towel  Sterile cotton swabs or 4-inch x 4-inch gauze pads  Sterile saline or soap and water  When to call your healthcare provider  Call your healthcare provider if you notice any of these changes:  The amount of fluid increases or decreases suddenly  Large amount of blood or a clot in drainage  Color, odor, or thickness of the fluid changes  Tube falls out or the incision opens  The stitch that holds the drain in place falls out, or is no longer attached to the drain.  Skin around the drain is red, swollen, painful, or seeping pus  You have a fever of 100.4°F ( 38°C ) or higher, or as directed by your healthcare provider  Chills  Tips  Here are tips to drain the tube:  Uncurl any kinks in the tube.  With one hand, firmly hold the base of the tube between your thumb and index finger. Don't touch the incision.  Put the  thumb and index finger of your other hand on the tube, next to the first hand. Pinch your fingers together. Then pull them along the tube toward the bag. This will help push any clogged fluid through the tube. This is called stripping the tube. You may find it helpful to hold an alcohol swab between your fingers and the tube to lubricate the tubing.  If the tube still does not drain, call your healthcare provider.  [employer] last reviewed this educational content on 3/1/2022  © 0077-1248 [employer]. All rights reserved. This information is not intended as a substitute for professional medical care. Always follow your healthcare professional's instructions.    Drain Record Sheet    Date Time Drain #1 Drain #2

## 2024-04-18 NOTE — ANESTHESIA PROCEDURE NOTES
Airway  Date/Time: 4/18/2024 11:09 AM  Urgency: elective      General Information and Staff    Patient location during procedure: OR  Anesthesiologist: Nick Pagan MD  Performed: anesthesiologist   Performed by: Nick Pagan MD  Authorized by: Nick Pagan MD      Indications and Patient Condition  Indications for airway management: anesthesia  Sedation level: deep  Preoxygenated: yes  Patient position: sniffing  Mask difficulty assessment: 1 - vent by mask    Final Airway Details  Final airway type: endotracheal airway      Successful airway: ETT  Cuffed: yes   Successful intubation technique: direct laryngoscopy  Endotracheal tube insertion site: oral  Blade: Jose J  Blade size: #4  ETT size (mm): 7.0    Placement verified by: capnometry   Measured from: lips  Number of attempts at approach: 1

## 2024-04-19 NOTE — OPERATIVE REPORT
[facility]    PATIENT'S NAME: TAMIKA ALCANTARA   ATTENDING PHYSICIAN: Breann Smith MD   OPERATING PHYSICIAN: Breann Smith MD   PATIENT ACCOUNT#:   [id number]    LOCATION:  [facility]  MEDICAL RECORD #:   [id number]       YOB: 1980  ADMISSION DATE:       04/18/2024      OPERATION DATE:  04/18/2024    OPERATIVE REPORT    PREOPERATIVE DIAGNOSIS:  Lower abdominal pannus, intertrigo, rectus diastasis.  POSTOPERATIVE DIAGNOSIS:  Lower abdominal pannus, intertrigo, rectus diastasis.  PROCEDURE:  Panniculectomy, abdominoplasty.    ASSISTANT:  ROB Rivera    ANESTHESIA:  General endotracheal anesthesia.    COMPLICATIONS:  None.    BLOOD LOSS:  Minimal.    INDICATIONS:  This is a 43-year-old female with a lower abdominal pannus and intertrigo, who presented to the office after losing over 60 pounds of her weight and is presenting with symptomatic pannus.  She was seen in the office to discuss the options for panniculectomy with abdominoplasty.  Potential risks, complications, benefits and alternatives were discussed.  Risks and complications including, but not limited to, infection, bleeding, scarring, damage to surrounding structures, hematoma, seroma, wound dehiscence, umbilical necrosis, need for further surgery.  The patient understands and wishes to proceed.  Questions were answered.    OPERATIVE TECHNIQUE:  Patient was identified in the preoperative area, informed consent was obtained, and sites were marked.  Patient was then brought back to the operating room and placed in supine position.  She was adequately padded, and sequential compression devices were applied.  General endotracheal anesthesia was administered.  Perioperative antibiotics were given.  Then, her entire chest and abdomen were prepped and draped in the usual sterile fashion.  Then, the procedure was started with confirming the markings, and then a 10 blade was used for the lower abdominal  incision.  Then, electrocautery was used to dissect down through subcutaneous fat, down to the anterior rectus sheath.  Then, the abdominal fat was elevated from the anterior rectus sheath.  In the central area where her previous  scar was, there were significant adhesions from the subcutaneous tissue to the anterior rectus sheath.  This was all released, and then a periumbilical incision was made using a 15 blade.  Then, the abdominal flap was split in the midline and raised all the way up to the xiphoid area.  Perforators were clipped.  Then, a plication of the rectus diastasis was performed.  This was measuring 2 to 3 cm at different levels.  The diastasis was repaired with #1 Ethibond figure-of-eight sutures from the xiphoid down to the mons pubis.  Additional Ethibond sutures were placed periumbilical transversely, as well as some 0 Vicryl sutures to oversew that.  Then, the patient was placed into the beach chair position, and the abdominal flap was pulled down, and the excess skin was marked for the panniculectomy.  This was then excised, and the total weight removed was 2630 g.  Then, two 15 round NADIR suction drains were placed and secured, then the area was irrigated, and then the abdominal flap was inset with 0 Vicryl sutures for the deep dermal layer, followed by 0 Vicryl sutures for Lou fascia, 3-0 Vicryl sutures for the deep dermal layer, followed by 4-0 Monocryl subcuticular sutures for the skin.  The umbilicus was delivered through the abdominal flap with a small oval incision and was inset with 3-0 Vicryl sutures, followed by 5-0 Prolene sutures.  Dermabond and Steri-Strips were applied to the lower abdominal incision, and bacitracin ointment, Xeroform, gauze, and Tegaderm with a bolster.  Then, TopiFoam and a binder were applied.  The patient tolerated the procedure well and awoke from anesthesia without difficulty.  All sponge and needle counts were correct at the end of the  case.    Dictated By Breann Smith MD  d: 04/18/2024 16:44:14  t: 04/18/2024 19:53:02  Job [id number]  [facility]/

## 2024-04-29 ENCOUNTER — OFFICE VISIT (OUTPATIENT)
Dept: SURGERY | Facility: CLINIC | Age: 44
End: 2024-04-29
Payer: OTHER GOVERNMENT

## 2024-04-29 DIAGNOSIS — E65 ABDOMINAL PANNUS: Primary | ICD-10-CM

## 2024-04-29 PROCEDURE — 99024 POSTOP FOLLOW-UP VISIT: CPT | Performed by: PHYSICIAN ASSISTANT

## 2024-04-29 NOTE — PROGRESS NOTES
Areli Villanueva is a 43 year old female who presents today for a follow-up after panniculectomy/abdominoplasty on 4/18/2024 with Dr. Smith. She denies fever and chills. She denies nausea, vomiting, diarrhea or constipation.   Her pain is controlled. She is taking Norco at night for pain relief.    Physical Exam     Abdomen: incisions clean, dry and intact without wound drainage or wound dehiscence. Umbilicus viable with prolene sutures in place. No erythema. No ecchymosis.No evidence of hematoma/seroma. Bilateral drain sites clean, dry and intact. Drain effluent serosanguinous.    There were no vitals filed for this visit.      Assessment and Plan     Areli Villanueva is doing well s/p panniculectomy/abdominoplasty on 4/18/2024 with Dr. Smith.     The right abdominal drain was removed today due to low output. Neosporin, gauze and tegederm was placed. She tolerated this well. A new drain dressing was placed on the remaining left abdominal drain. We reviewed parameters for drain removal and she will contact us when her drain is ready for removal. Prolene sutures were removed. A new umbilical dressing of neosporin, gauze and steri-strips was placed. She will change this daily. New steri-strips were placed on the lower abdominal incision. She should continue to wear her abdominal binder at all times and continue with activity restrictions. She will follow up on 5/8/2024 with Dr. Smith for wound check or sooner if she has any questions/concerns.    Questions were answered. Patient understands.     CARSON Fischer  4/29/2024  11:26 AM

## 2024-05-08 ENCOUNTER — OFFICE VISIT (OUTPATIENT)
Dept: SURGERY | Facility: CLINIC | Age: 44
End: 2024-05-08
Payer: OTHER GOVERNMENT

## 2024-05-08 DIAGNOSIS — E65 ABDOMINAL PANNUS: Primary | ICD-10-CM

## 2024-05-08 PROCEDURE — 99024 POSTOP FOLLOW-UP VISIT: CPT | Performed by: SURGERY

## 2024-05-08 NOTE — PROGRESS NOTES
Areli Villanueva is a 43 year old female who presents today for a follow-up after panniculectomy/abdominoplasty on 4/18/2024. She denies fever and chills. She denies nausea, vomiting, diarrhea or constipation. Her pain is controlled.      Physical Exam     Abdomen: incisions clean, dry and intact without wound drainage or wound dehiscence. Umbilicus viable. No erythema. No ecchymosis.No evidence of hematoma/seroma. Left drain site clean, dry and intact. Drain effluent serosanguinous.     There were no vitals filed for this visit.      Assessment and Plan     Areli Villanueva is doing well s/p panniculectomy/abdominoplasty on 4/18/2024    The remaining abdominal drain was removed today due to low output.  Neosporin, gauze, Tegaderm was placed.  She tolerated this well. We reviewed options for scar management including vitamin E oil, silicone products, scar massage and sun protection/sun block.  We reviewed recommendations for activity restriction and compression.  She will follow-up in 6 to 12 months or sooner with any questions or concerns.    Questions were answered. Patient understands.

## 2025-04-29 NOTE — CONSULTS
Estabilshed Patient Consultation    Chief Complaint: s/p abdominoplasty     History of Present Illness:   Areli Villanueva is a 44 year old female who returns to the office after panniculectomy/abdominoplasty on 4/18/2024. She returns today for recheck. She is overall doing well after surgery but reports some discomfort and itching at the end of the day with her abdominal dog ears.    Past Medical History:      Past Medical History[1]      Past Surgical History:  Past Surgical History[2]      Medications:    Current Medications[3]      Allergies:    Allergies[4]      Family History:   Family History[5]      Social History:  History   Alcohol Use    Yes     Comment: 1X/MONTH       History   Smoking Status    Never   Smokeless Tobacco    Never       History   Drug Use Unknown         Review of Systems:    The patient reports: see HPI  All other systems are unremarkable.      Physical Exam:    There were no vitals taken for this visit.    Constitutional: The patient is an alert, oriented and well-developed.     Neurologic: Speech patterns and movements are normal.     Psychiatric: Affect is appropriate.    Eyes: Conjunctiva are clear, non-icteric. PERRL    ENT: no obvious abnormality, no ear drainage, mucous membranes moist and pink    Integument/Skin: The skin appears normal. There are no suspicious appearing rashes or lesions.    Respiratory: Normal respiratory effort.     Cardiovascular: no cyanosis, no edema    Gastrointestinal: abdominal incisions well healed; dog ears bilateral lateral abdomen, right greater than left; slight prominence of base of umbilicus; no hernias    Musculoskeletal: Extremities unremarkable, without edema, tenderness or deformities      Impression:   Areli Villanueva  is a 44 year old s/p panniculectomy/abdominoplasty on 4/18/2024.     Discussion and Plan:  The patient was counseled on the different treatment options.     We discussed the option for dog ear excision in the office.  These are bothersome and she has itching and discomfort at these areas at the end of day. We will obtain insurance approval.     For her umbilical prominence, we discussed making sure she has extra support with straining/exercise.     She will follow up for in office dog ear excision once approved. She was encouraged to contact our office with any questions or concerns.     25 minutes were spent with the patient, from which 20 minutes were spent counseling the patient and coordinating care.    The  Cures Act makes medical notes like these available to patients in the interest of transparency. Please be advised this is a medical document. Medical documents are intended to carry relevant information, facts as evident, and the clinical opinion of the practitioner. The medical note is intended as peer to peer communication and may appear blunt or direct. It is written in medical language and may contain abbreviations or verbiage that are unfamiliar.          [1]   Past Medical History:   Anxiety    Asthma (HCC)    Disorder of liver    FATTY LIVER    PCOS (polycystic ovarian syndrome)    PONV (postoperative nausea and vomiting)    Seizure disorder (HCC)    FEBRILE AT 4 YEARS OLD    Visual impairment    GLASSES AND CONTACTS   [2]   Past Surgical History:  Procedure Laterality Date          Hc  section level i      , , , 2017    Hysterectomy  2018   [3]   No outpatient medications have been marked as taking for the 25 encounter (Appointment) with Breann Smith MD.   [4] No Known Allergies  [5]   Family History  Problem Relation Age of Onset    Melanoma Mother     Diabetes Father     PTSD Brother     Stroke Brother

## 2025-04-30 ENCOUNTER — OFFICE VISIT (OUTPATIENT)
Dept: SURGERY | Facility: CLINIC | Age: 45
End: 2025-04-30
Payer: OTHER GOVERNMENT

## 2025-04-30 DIAGNOSIS — E65 ABDOMINAL PANNUS: Primary | ICD-10-CM

## 2025-04-30 PROCEDURE — 99213 OFFICE O/P EST LOW 20 MIN: CPT | Performed by: SURGERY

## 2025-08-27 ENCOUNTER — OFFICE VISIT (OUTPATIENT)
Dept: SURGERY | Facility: CLINIC | Age: 45
End: 2025-08-27

## 2025-08-27 DIAGNOSIS — E65 ABDOMINAL PANNUS: Primary | ICD-10-CM

## 2025-08-27 PROCEDURE — 11406 EXC TR-EXT B9+MARG >4.0 CM: CPT | Performed by: SURGERY

## 2025-08-27 PROCEDURE — 99213 OFFICE O/P EST LOW 20 MIN: CPT | Performed by: SURGERY

## 2025-08-27 PROCEDURE — 13101 CMPLX RPR TRUNK 2.6-7.5 CM: CPT | Performed by: SURGERY

## 2025-08-27 PROCEDURE — 13102 CMPLX RPR TRUNK ADDL 5CM/<: CPT | Performed by: SURGERY

## (undated) DEVICE — SLEEVE COMPR MD KNEE LEN SGL USE KENDALL SCD

## (undated) DEVICE — PISTOL GRIP SKIN STAPLER: Brand: MULTIFIRE PREMIUM

## (undated) DEVICE — PLASTIC BREAST CDS-LF: Brand: MEDLINE INDUSTRIES, INC.

## (undated) DEVICE — GOWN,PREVENTION PLUS,LARGE,STERILE: Brand: MEDLINE

## (undated) DEVICE — GLOVE SUR 7 SENSICARE PI PIP GRN PWD F

## (undated) DEVICE — DRAIN SUR 15FR L3/16IN DIA4.7MM SIL RND

## (undated) DEVICE — 3M™ IOBAN™ 2 ANTIMICROBIAL INCISE DRAPE 6648EZ: Brand: IOBAN™ 2

## (undated) DEVICE — E-Z CLEAN, NON-STICK, PTFE COATED, ELECTROSURGICAL NEEDLE ELECTRODE, MODIFIED EXTENDED INSULATION, 2.75 INCH (7 CM): Brand: MEGADYNE

## (undated) DEVICE — ANTIBACTERIAL UNDYED BRAIDED (POLYGLACTIN 910), SYNTHETIC ABSORBABLE SUTURE: Brand: COATED VICRYL

## (undated) DEVICE — SOLUTION IRRIG 1000ML 0.9% NACL USP BTL

## (undated) DEVICE — SUT ETHLN 3-0 18IN PS-1 NABSRB BLK 24MM 3/8 C

## (undated) DEVICE — SUT COAT VCRL 4-0 27IN RB-1 ABSRB VLT 17MM 1/

## (undated) DEVICE — SUT PROL SZ 5-0 36IN RB-1 NABSRB BL

## (undated) DEVICE — SUT ETHBND XL 1 30IN NABSRB GRN 36MM CT-1 1/2 CIR TAPR

## (undated) DEVICE — INSULATED BLADE ELECTRODE: Brand: EDGE

## (undated) DEVICE — SUT MCRYL 4-0 18IN PS-2 ABSRB UD 19MM 3/8 CIR

## (undated) DEVICE — DRAPE,TOWEL,LARGE,INVISISHIELD: Brand: MEDLINE

## (undated) DEVICE — PAD DSG 8X12IN THK0.5IN FOAM SIL BK ADH

## (undated) DEVICE — ELECTRODE ES 2.75IN PTFE BLDE MOD E-Z CLN

## (undated) DEVICE — PROXIMATE SKIN STAPLERS (35 WIDE) CONTAINS 35 STAINLESS STEEL STAPLES (FIXED HEAD): Brand: PROXIMATE

## (undated) DEVICE — SYRINGE MED 10ML LL CTRL W/ FNGR GRP CLR BRL

## (undated) DEVICE — MARKER SKIN PREP RESIST STRL

## (undated) DEVICE — #15 STERILE STAINLESS BLADE: Brand: STERILE STAINLESS BLADES

## (undated) DEVICE — EVACUATOR SUR 100CC SIL BLB WND

## (undated) DEVICE — ZZDISC - USE 405166-SUT COAT VCRL 3-0 27IN SH ABSRB UD 26MM 1/2

## (undated) DEVICE — LIGACLIP MCA MULTIPLE CLIP APPLIERS, 30 MEDIUM CLIPS: Brand: LIGACLIP

## (undated) DEVICE — ADHESIVE SKIN TOP FOR WND CLSR DERMBND ADV

## (undated) DEVICE — SUT COAT VCRL 0 27IN CT-1 ABSRB UD 36MM 1/2

## (undated) DEVICE — TRAY CATH 16FR F INCL BARDX IC COMPLT CARE

## (undated) DEVICE — BANDAGE,GAUZE,BULKEE II,4.5"X4.1YD,STRL: Brand: MEDLINE